# Patient Record
Sex: MALE | Race: WHITE | Employment: OTHER | URBAN - METROPOLITAN AREA
[De-identification: names, ages, dates, MRNs, and addresses within clinical notes are randomized per-mention and may not be internally consistent; named-entity substitution may affect disease eponyms.]

---

## 2018-06-18 ENCOUNTER — HOSPITAL ENCOUNTER (EMERGENCY)
Facility: CLINIC | Age: 28
Discharge: HOME | End: 2018-06-18
Attending: EMERGENCY MEDICINE | Admitting: EMERGENCY MEDICINE
Payer: MEDICARE

## 2018-06-18 VITALS
SYSTOLIC BLOOD PRESSURE: 138 MMHG | OXYGEN SATURATION: 99 % | HEART RATE: 93 BPM | DIASTOLIC BLOOD PRESSURE: 99 MMHG | TEMPERATURE: 98.7 F

## 2018-06-18 DIAGNOSIS — L02.01 ABSCESS OF FACE: ICD-10-CM

## 2018-06-18 PROCEDURE — 99283 EMERGENCY DEPT VISIT LOW MDM: CPT | Mod: Z6 | Performed by: EMERGENCY MEDICINE

## 2018-06-18 PROCEDURE — 99283 EMERGENCY DEPT VISIT LOW MDM: CPT

## 2018-06-18 PROCEDURE — 25000132 ZZH RX MED GY IP 250 OP 250 PS 637: Mod: GY | Performed by: EMERGENCY MEDICINE

## 2018-06-18 PROCEDURE — A9270 NON-COVERED ITEM OR SERVICE: HCPCS | Mod: GY | Performed by: EMERGENCY MEDICINE

## 2018-06-18 RX ORDER — CLINDAMYCIN HCL 150 MG
300 CAPSULE ORAL ONCE
Status: COMPLETED | OUTPATIENT
Start: 2018-06-18 | End: 2018-06-18

## 2018-06-18 RX ORDER — CLINDAMYCIN HCL 300 MG
300 CAPSULE ORAL 4 TIMES DAILY
Qty: 40 CAPSULE | Refills: 0 | Status: SHIPPED | OUTPATIENT
Start: 2018-06-18 | End: 2018-06-28

## 2018-06-18 RX ADMIN — CLINDAMYCIN HYDROCHLORIDE 300 MG: 150 CAPSULE ORAL at 19:07

## 2018-06-18 NOTE — ED NOTES
"Pt presents to ER with c/o frenula abscess.  Pt states 4 days ago the tip of his nose was red and swollen so he \"poked it with a pin and squeezed all of this white pus out\".  Now he has an area of swelling and redness to frenula area.  It is painful.   "

## 2018-06-18 NOTE — ED AVS SNAPSHOT
[facility] Emergency Department    [address] [address]    Phone:  [phone]    Fax:  [phone]                                       Spencer Andrea   MRN: [id number]    Department:  [facility] Emergency Department   Date of Visit:  6/18/2018           After Visit Summary Signature Page     I have received my discharge instructions, and my questions have been answered. I have discussed any challenges I see with this plan with the nurse or doctor.    ..........................................................................................................................................  Patient/Patient Representative Signature      ..........................................................................................................................................  Patient Representative Print Name and Relationship to Patient    ..................................................               ................................................  Date                                            Time    ..........................................................................................................................................  Reviewed by Signature/Title    ...................................................              ..............................................  Date                                                            Time

## 2018-06-18 NOTE — ED PROVIDER NOTES
History     Chief Complaint   Patient presents with     Wound Infection     infection on nose and into mouth     HPI  Spencer Andrea is a 28 year old male who presents with concerns for infection between the nose and upper lip.  Noted that he was able to express some pus from just inside the left naris last evening when he could not sleep due to severe pain.  Started noted swelling and pain 4 days ago.  Also has had upper tooth pain the last few days.  No noted tooth decay.  No trauma.  No piercings.  No previous facial infections.  No previous MRSA infection.  No systemic symptoms.      Problem List:    There are no active problems to display for this patient.       Past Medical History:    Past Medical History:   Diagnosis Date     Depressive disorder        Past Surgical History:    No past surgical history on file.    Family History:    No family history on file.    Social History:  Marital Status:   [2]  Social History   Substance Use Topics     Smoking status: Current Every Day Smoker     Packs/day: 1.00     Smokeless tobacco: Never Used     Alcohol use No        Medications:      clindamycin (CLEOCIN) 300 MG capsule   divalproex (DEPAKOTE ER) 500 MG 24 hr tablet   OLANZAPINE PO         Review of Systems  All pertinent positives and negatives are documented in the HPI.  All others reviewed and are negative .    Physical Exam   BP: (!) 138/99  Pulse: 93  Temp: 98.7  F (37.1  C)  SpO2: 99 %      Physical Exam  Vital signs reviewed  Facial swelling noted from the left naris soft tissue nasal septal area extending through the upper lip.  Flipping upper lip noted an abscess protruding at the gum reflection 1 x 1 cm.  No obvious tooth decay or dental tenderness.  Oral posterior pharynx normal  Able to express some pus from an opening just inside the left naris  ED Course     ED Course     Procedures  Verbal consent  Inner lip was flipped up out of the way.  The gum reflection there was a sizable abscess  protruding approximately 1 x 1 cm.  Infiltrated this with 1% lidocaine with epinephrine using 30-gauge needle.  Completed alveolar nerve block.  Open tooth #11 blade.  Blunt dissection with a mosquito.  Drained pus pocket that extended up just underneath the left naris.              Assessments & Plan (with Medical Decision Making)   28-year-old male presents with soft tissue abscess that was protruding at the gum reflection just inside the upper lip.  Was able to I&D it by completing infiltration of the tissue around the abscess and an alveolar nerve block.  Refer to the procedure note above.  Recommended treatment is clindamycin.  This will cover for the odontogenic/oral yvrose as well as MRSA.. Commended dental appointment for x-ray to make sure he does not have a tooth abscess.  Clinic recheck in 3-4 days.  Ibuprofen for discomfort.  Salt water gargles.  Compresses     I have reviewed the nursing notes.    I have reviewed the findings, diagnosis, plan and need for follow up with the patient.      New Prescriptions    CLINDAMYCIN (CLEOCIN) 300 MG CAPSULE    Take 1 capsule (300 mg) by mouth 4 times daily for 10 days       Final diagnoses:   Abscess of face-upper alveolar/gingiva extending to left naris       6/18/2018   [facility] EMERGENCY DEPARTMENT     Abelardo Yarbrough, DO  06/18/18 4220

## 2018-06-18 NOTE — ED NOTES
Assisted DR , opended cyst inside upper lip in middle, helped with clearing away puss, and retracting lip.

## 2018-06-18 NOTE — ED AVS SNAPSHOT
[facility] Emergency Department    [address] [address]    Phone:  [phone]    Fax:  [phone]                                       Spencer Andrea   MRN: [id number]    Department:  [facility] Emergency Department   Date of Visit:  6/18/2018           Patient Information     Date Of Birth          1990        Your diagnoses for this visit were:     Abscess of face-upper alveolar/gingiva extending to left naris        You were seen by Abelardo Yarbrough DO.        Discharge Instructions       Clindamycin 300 mg 4 times daily ×10 days  Arrange for a dental appointment to have dental x-rays to determine if he could have an associated tooth  Abscess.  Uncertain if abscess started in the oral cavity extending to the nasal or if it started nasal extended to oral cavity.  Warm compresses  Saltwater gargle 4 times daily  Need clinic appointment recheck in 3-4 days.  If not showing significant improvement will need CT imaging soft tissue.    24 Hour Appointment Hotline       To make an appointment at any [facility] clinic, call [phone] ([phone]). If you don't have a family doctor or clinic, we will help you find one. [facility] clinics are conveniently located to serve the needs of you and your family.             Review of your medicines      START taking        Dose / Directions Last dose taken    clindamycin 300 MG capsule   Commonly known as:  CLEOCIN   Dose:  300 mg   Quantity:  40 capsule        Take 1 capsule (300 mg) by mouth 4 times daily for 10 days   Refills:  0          Our records show that you are taking the medicines listed below. If these are incorrect, please call your family doctor or clinic.        Dose / Directions Last dose taken    divalproex sodium extended-release 500 MG 24 hr tablet   Commonly known as:  DEPAKOTE ER   Dose:  1000 mg   Indication:  Mixed Bipolar Affective Disorder        Take 1,000 mg by mouth daily   Refills:  0         "OLANZAPINE PO   Dose:  15 mg   Indication:  Schizophrenia        Take 15 mg by mouth daily as needed for other (pt states to take as needed when he hears voices)   Refills:  0                Prescriptions were sent or printed at these locations (1 Prescription)                   [facility] - [place], [place] - [address]   [address] [place], [place] [address]    Telephone:  [phone]   Fax:  [phone]   Hours:                  E-Prescribed (1 of 1)         clindamycin (CLEOCIN) 300 MG capsule                Orders Needing Specimen Collection     None      Pending Results     No orders found from 6/16/2018 to 6/19/2018.            Pending Culture Results     No orders found from 6/16/2018 to 6/19/2018.            Pending Results Instructions     If you had any lab results that were not finalized at the time of your Discharge, you can call the ED Lab Result RN at [phone]. You will be contacted by this team for any positive Lab results or changes in treatment. The nurses are available 7 days a week from 10A to 6:30P.  You can leave a message 24 hours per day and they will return your call.        Test Results From Your Hospital Stay               Thank you for choosing [facility]       Thank you for choosing [facility] for your care. Our goal is always to provide you with excellent care. Hearing back from our patients is one way we can continue to improve our services. Please take a few minutes to complete the written survey that you may receive in the mail after you visit with us. Thank you!        [employer] Information     [employer] lets you send messages to your doctor, view your test results, renew your prescriptions, schedule appointments and more. To sign up, go to [email or web address] . Click on \"Log in\" on the left side of the screen, which will take you to the Welcome page. Then click on \"Sign up Now\" on the right side of the page.     You will be asked to enter the access code listed " below, as well as some personal information. Please follow the directions to create your username and password.     Your access code is: [email or web address]  Expires: 2018  6:55 PM     Your access code will  in 90 days. If you need help or a new code, please call your [facility] [place] clinic or [phone].        Care EveryWhere ID     This is your Care EveryWhere ID. This could be used by other organizations to access your [facility] medical records  [id number]        Equal Access to Services     GURINDER LEIJA : Connor Mcpherson, wastefan cmdaniel, duglas kaaljose j hardy, les coleman . So [facility] [phone].    ATENCIÓN: Si habla español, tiene a feldman disposición servicios gratuitos de asistencia lingüística. Llame al [phone].    We comply with applicable federal civil rights laws and [place] laws. We do not discriminate on the basis of race, color, national origin, age, disability, sex, sexual orientation, or gender identity.            After Visit Summary       This is your record. Keep this with you and show to your community pharmacist(s) and doctor(s) at your next visit.

## 2018-06-18 NOTE — LETTER
June 18, 2018      To Whom It May Concern:      Spencer Andrea was seen in our Emergency Department today, 06/18/18.  Patient was seen for a facial abscess that required surgical drainage.  Currently on oral antibiotics.  Patient was medically recommended to be off work for the next 3 days until he is reassessed in the clinic.    Sincerely,        Abelardo Yarbrough Dr.  [facility] ED Staff Physician

## 2018-06-18 NOTE — DISCHARGE INSTRUCTIONS
Clindamycin 300 mg 4 times daily ×10 days  Arrange for a dental appointment to have dental x-rays to determine if he could have an associated tooth  Abscess.  Uncertain if abscess started in the oral cavity extending to the nasal or if it started nasal extended to oral cavity.  Warm compresses  Saltwater gargle 4 times daily  Need clinic appointment recheck in 3-4 days.  If not showing significant improvement will need CT imaging soft tissue.

## 2018-11-08 ENCOUNTER — HOSPITAL ENCOUNTER (EMERGENCY)
Facility: CLINIC | Age: 28
Discharge: HOME | End: 2018-11-08
Attending: NURSE PRACTITIONER | Admitting: NURSE PRACTITIONER
Payer: MEDICARE

## 2018-11-08 VITALS
DIASTOLIC BLOOD PRESSURE: 96 MMHG | HEART RATE: 82 BPM | OXYGEN SATURATION: 97 % | TEMPERATURE: 98.7 F | SYSTOLIC BLOOD PRESSURE: 144 MMHG | RESPIRATION RATE: 18 BRPM

## 2018-11-08 DIAGNOSIS — R10.32 ABDOMINAL PAIN, LEFT LOWER QUADRANT: ICD-10-CM

## 2018-11-08 DIAGNOSIS — J02.9 SORE THROAT: ICD-10-CM

## 2018-11-08 PROBLEM — F29 PSYCHOTIC DISORDER (H): Status: ACTIVE | Noted: 2018-01-13

## 2018-11-08 PROBLEM — F25.0 SCHIZOAFFECTIVE DISORDER, BIPOLAR TYPE (H): Status: ACTIVE | Noted: 2018-11-08

## 2018-11-08 LAB
DEPRECATED S PYO AG THROAT QL EIA: NORMAL
FLUAV+FLUBV AG SPEC QL: NEGATIVE
FLUAV+FLUBV AG SPEC QL: NEGATIVE
SPECIMEN SOURCE: NORMAL
SPECIMEN SOURCE: NORMAL

## 2018-11-08 PROCEDURE — 87081 CULTURE SCREEN ONLY: CPT | Performed by: NURSE PRACTITIONER

## 2018-11-08 PROCEDURE — 99284 EMERGENCY DEPT VISIT MOD MDM: CPT | Mod: Z6 | Performed by: NURSE PRACTITIONER

## 2018-11-08 PROCEDURE — 87077 CULTURE AEROBIC IDENTIFY: CPT | Performed by: NURSE PRACTITIONER

## 2018-11-08 PROCEDURE — 87880 STREP A ASSAY W/OPTIC: CPT | Performed by: EMERGENCY MEDICINE

## 2018-11-08 PROCEDURE — 99283 EMERGENCY DEPT VISIT LOW MDM: CPT | Performed by: NURSE PRACTITIONER

## 2018-11-08 PROCEDURE — 87804 INFLUENZA ASSAY W/OPTIC: CPT | Performed by: EMERGENCY MEDICINE

## 2018-11-08 ASSESSMENT — ENCOUNTER SYMPTOMS
COUGH: 1
SORE THROAT: 1
BLOOD IN STOOL: 0
BACK PAIN: 0
FEVER: 1
MYALGIAS: 0
FREQUENCY: 0
HEADACHES: 0
CHILLS: 1
NAUSEA: 0
ABDOMINAL PAIN: 1
DYSURIA: 0
VOMITING: 0
DIZZINESS: 0
DIARRHEA: 0

## 2018-11-08 NOTE — ED AVS SNAPSHOT
[facility]    [address] [address]    Phone:  [phone]    Fax:  [phone]                                       Spencer Andrea   MRN: [id number]    Department:  [facility]   Date of Visit:  11/8/2018           Patient Information     Date Of Birth          1990        Your diagnoses for this visit were:     Abdominal pain, left lower quadrant-- recommend follow-up with general surgeon for hernia evaluation     Sore throat- likely viral respiratory illness. Rapid strep and influenza negative. Throat culture pending.        You were seen by Anisa Robles APRN CNP.      Follow-up Information     Schedule an appointment as soon as possible for a visit with [facility].    Specialty:  Surgery    Contact information:    [address] [place] [address] [phone]    Additional information:    The medical center is located at   [address] [place] [address] (between [facility] and   [facility] [place], [place] north   of [place]).        Discharge Instructions       Make an appointment with the Surgery Clinic for further evaluation of possible left inguinal hernia. [phone].  Symptomatic treatment for sore throat and cough.    Viral Upper Respiratory Illness (Adult)  You have a viral upper respiratory illness (URI), which is another term for the common cold. This illness is contagious during the first few days. It is spread through the air by coughing and sneezing. It may also be spread by direct contact (touching the sick person and then touching your own eyes, nose, or mouth). Frequent handwashing will decrease risk of spread. Most viral illnesses go away within 7 to 10 days with rest and simple home remedies. Sometimes the illness may last for several weeks. Antibiotics will not kill a virus, and they are generally not prescribed for this condition.    Home care    If symptoms are severe,  rest at home for the first 2 to 3 days. When you resume activity, don't let yourself get too tired.    Don't smoke. If you need help stopping, talk with your healthcare provider.    Avoid being exposed to cigarette smoke (yours or others ).    You may use acetaminophen or ibuprofen to control pain and fever, unless another medicine was prescribed. If you have chronic liver or kidney disease, have ever had a stomach ulcer or gastrointestinal bleeding, or are taking blood-thinning medicines, talk with your healthcare provider before using these medicines. Aspirin should never be given to anyone under 18 years of age who is ill with a viral infection or fever. It may cause severe liver or brain damage.    Your appetite may be poor, so a light diet is fine. Stay well hydrated by drinking 6 to 8 glasses of fluids per day (water, soft drinks, juices, tea, or soup). Extra fluids will help loosen secretions in the nose and lungs.    Over-the-counter cold medicines will not shorten the length of time you re sick, but they may be helpful for the following symptoms: cough, sore throat, and nasal and sinus congestion. If you take prescription medicines, ask your healthcare provider or pharmacist which over-the-counter medicines are safe to use. (Note: Don't use decongestants if you have high blood pressure.)  Follow-up care  Follow up with your healthcare provider, or as advised.  When to seek medical advice  Call your healthcare provider right away if any of these occur:    Cough with lots of colored sputum (mucus)    Severe headache; face, neck, or ear pain    Difficulty swallowing due to throat pain    Fever of 100.4 F (38 C) or higher, or as directed by your healthcare provider  Call 911  Call 911 if any of these occur:    Chest pain, shortness of breath, wheezing, or difficulty breathing    Coughing up blood    Very severe pain with swallowing, especially if it goes along with a muffled voice   Date Last Reviewed:  6/1/2018 2000-2018 [employer]. [address] [place] [address], [place] [address]. All rights reserved. This information is not intended as a substitute for professional medical care. Always follow your healthcare professional's instructions.             24 Hour Appointment Hotline       To make an appointment at any [facility], call [phone]). If you don't have a family doctor or clinic, we will help you find one. [facility] are conveniently located to serve the needs of you and your family.             Review of your medicines      Notice     You have not been prescribed any medications.            Procedures and tests performed during your visit     Beta strep group A culture    Influenza A/B antigen    Rapid strep screen      Orders Needing Specimen Collection     None      Pending Results     Date and Time Order Name Status Description    11/8/2018 0947 Beta strep group A culture In process             Pending Culture Results     Date and Time Order Name Status Description    11/8/2018 0947 Beta strep group A culture In process             Pending Results Instructions     If you had any lab results that were not finalized at the time of your Discharge, you can call the ED Lab Result RN at [phone]. You will be contacted by this team for any positive Lab results or changes in treatment. The nurses are available 7 days a week from 10A to 6:30P.  You can leave a message 24 hours per day and they will return your call.        Test Results From Your Hospital Stay        11/8/2018 10:12 AM      Component Results     Component    Specimen Description    Throat    Rapid Strep A Screen    NEGATIVE: No Group A streptococcal antigen detected by immunoassay, await culture report.         11/8/2018 10:19 AM      Component Results     Component Value Ref Range & Units Status    Influenza A/B Agn Specimen Nasopharyngeal  Final    Influenza A Negative NEG^Negative Final     "Influenza B Negative NEG^Negative Final    Test results must be correlated with clinical data. If necessary, results   should be confirmed by a molecular assay or viral culture.           2018 10:13 AM                Thank you for choosing [facility]       Thank you for choosing [facility] for your care. Our goal is always to provide you with excellent care. Hearing back from our patients is one way we can continue to improve our services. Please take a few minutes to complete the written survey that you may receive in the mail after you visit with us. Thank you!        [employer] Information     [employer] lets you send messages to your doctor, view your test results, renew your prescriptions, schedule appointments and more. To sign up, go to [email or web address] . Click on \"Log in\" on the left side of the screen, which will take you to the Welcome page. Then click on \"Sign up Now\" on the right side of the page.     You will be asked to enter the access code listed below, as well as some personal information. Please follow the directions to create your username and password.     Your access code is: [email or web address]  Expires: 2019 10:46 AM     Your access code will  in 90 days. If you need help or a new code, please call your [facility] clinic or [phone].        Care EveryWhere ID     This is your Care EveryWhere ID. This could be used by other organizations to access your [facility] medical records  [id number]        Equal Access to Services     GURINDER LEIJA : Hadii iris Mcpherson, waaxda lujuanadaha, qaybta kaalmada hayley, les ocleman . So [facility] [phone].    ATENCIÓN: Si habla español, tiene a feldman disposición servicios gratuitos de asistencia lingüística. Llame al [phone].    We comply with applicable federal civil rights laws and [place] laws. We do not discriminate on the basis of race, color, national origin, age, disability, sex, sexual orientation, or " gender identity.            After Visit Summary       This is your record. Keep this with you and show to your community pharmacist(s) and doctor(s) at your next visit.

## 2018-11-08 NOTE — ED AVS SNAPSHOT
[facility] Emergency Department    [address] [address]    Phone:  [phone]    Fax:  [phone]                                       Spencer Andrea   MRN: [id number]    Department:  [facility] Emergency Department   Date of Visit:  11/8/2018           After Visit Summary Signature Page     I have received my discharge instructions, and my questions have been answered. I have discussed any challenges I see with this plan with the nurse or doctor.    ..........................................................................................................................................  Patient/Patient Representative Signature      ..........................................................................................................................................  Patient Representative Print Name and Relationship to Patient    ..................................................               ................................................  Date                                   Time    ..........................................................................................................................................  Reviewed by Signature/Title    ...................................................              ..............................................  Date                                               Time          22EPIC Rev 08/18

## 2018-11-08 NOTE — ED PROVIDER NOTES
History     Chief Complaint   Patient presents with     Pharyngitis     x 3 days     Abdominal Pain     he states he has a hernia.     HPI  Spencer Andrea is a 28 year old male with history of schizoaffective disorder, cannabis and methamphetamine use, and depression who is accompanied by his father for evaluation of sore throat and abdominal pain.  Patient reports sore throat and cough for the last 3 days.  Feels feverish.  Cough is nonproductive.  Additionally patient reports abdominal pain for several months.  Patient was told he had a lower abdominal hernia several years ago and is concerned that the hernia is worse and causing his pain.  Abdominal pain comes and goes.  More frequent pain over the last few months.  Eating and drinking normally.  Denies nausea or vomiting.  Denies diarrhea.  Denies black or bloody stool.    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    Family History:    No family history on file.    Social History:  Marital Status:   [2]  Social History   Substance Use Topics     Smoking status: Current Every Day Smoker     Packs/day: 1.00     Smokeless tobacco: Never Used     Alcohol use No        Medications:      No current outpatient prescriptions on file.      Review of Systems   Constitutional: Positive  for chills and fever.   HENT: Positive for sore throat. Negative for congestion and ear pain.    Respiratory: Positive for cough.    Cardiovascular: Negative for chest pain.   Gastrointestinal: Positive for abdominal pain. Negative for blood in stool, diarrhea, nausea and vomiting.   Genitourinary: Negative for dysuria, frequency, testicular pain and urgency.   Musculoskeletal: Negative for back pain and myalgias.   Skin: Negative for rash.   Neurological: Negative for dizziness and headaches.       Physical Exam   BP: (!) 144/96  Pulse: 82  Temp: 98.7  F (37.1  C)  Resp: 18  SpO2: 97 %      Physical Exam   Constitutional: He is oriented to person, place, and time. He appears well-developed and well-nourished. No distress.   HENT:   Head: Normocephalic and atraumatic.   Right Ear: External ear normal.   Left Ear: External ear normal.   Nose: Nose normal.   Mouth/Throat: Oropharynx is clear and moist.   Eyes: Conjunctivae and EOM are normal.   Neck: Normal range of motion. Neck supple.   Cardiovascular: Normal rate, regular rhythm and normal heart sounds.    No murmur heard.  Pulmonary/Chest: Effort normal and breath sounds normal. No respiratory distress.   Abdominal: Soft. Bowel sounds are normal. He exhibits no distension. There is no tenderness.   Genitourinary: Testes normal and penis normal. Cremasteric reflex is present. Right testis shows no mass and no tenderness. Left testis shows no mass and no tenderness. Circumcised. No phimosis, penile erythema or penile tenderness.   Genitourinary Comments: No palpable abdominal or inguinal hernia.   Musculoskeletal: Normal range of motion.   Lymphadenopathy:     He has no cervical adenopathy.   Neurological: He is alert and oriented to person, place, and time.   Skin: Skin is warm and dry.       ED Course     ED Course     Procedures         Results for orders placed or performed during the hospital encounter of 11/08/18 (from the past 24 hour(s))   Rapid strep screen    Result Value Ref Range    Specimen Description Throat     Rapid Strep A Screen       NEGATIVE: No Group A streptococcal antigen detected by immunoassay, await culture report.   Influenza A/B antigen   Result Value Ref Range    Influenza A/B Agn Specimen Nasopharyngeal     Influenza A Negative NEG^Negative    Influenza B Negative NEG^Negative       Medications - No data to display    Assessments & Plan (with Medical Decision Making)   28 year old male with cough and sore throat for 3 days.  Also reporting left low abdominal pain that comes/goes for months and told he has a hernia in this region several years ago.  On exam patient is in NAD. Afebrile. Posterior oropharynx erythema without exudate. Lung sounds CTA. No tachypnea, tachycardia, or hypoxia. No abdominal discomfort with palpation, no obvious abdominal or inguinal hernia palpated.  RST negative with culture pending.  No evidence of peritonsillar cellulitis or abscess.  Influenza A/B negative. No indication for chest xray at this point. Father and patient were instructed to continue OTC symptomatic treatment.  Follow up with PCP if no improvement in 5-7 days. Make an appointment with general surgery clinic for further evaluation of intermittent abdominal pain with possible hernia.  Worrisome reasons to seek care sooner discussed.      I have reviewed the nursing notes.    I have reviewed the findings, diagnosis, plan and need for follow up with the patient.      New Prescriptions    No medications on file       Final diagnoses:   Abdominal pain, left lower quadrant-- recommend follow-up with general surgeon for hernia evaluation   Sore throat- likely viral respiratory illness. Rapid strep and influenza negative. Throat culture pending.       11/8/2018   [facility] EMERGENCY DEPARTMENT     Anisa Robles, MELISSA CNP  11/08/18 3502

## 2018-11-08 NOTE — DISCHARGE INSTRUCTIONS
Make an appointment with the Surgery Clinic for further evaluation of possible left inguinal hernia. [phone].  Symptomatic treatment for sore throat and cough.    Viral Upper Respiratory Illness (Adult)  You have a viral upper respiratory illness (URI), which is another term for the common cold. This illness is contagious during the first few days. It is spread through the air by coughing and sneezing. It may also be spread by direct contact (touching the sick person and then touching your own eyes, nose, or mouth). Frequent handwashing will decrease risk of spread. Most viral illnesses go away within 7 to 10 days with rest and simple home remedies. Sometimes the illness may last for several weeks. Antibiotics will not kill a virus, and they are generally not prescribed for this condition.    Home care    If symptoms are severe, rest at home for the first 2 to 3 days. When you resume activity, don't let yourself get too tired.    Don't smoke. If you need help stopping, talk with your healthcare provider.    Avoid being exposed to cigarette smoke (yours or others ).    You may use acetaminophen or ibuprofen to control pain and fever, unless another medicine was prescribed. If you have chronic liver or kidney disease, have ever had a stomach ulcer or gastrointestinal bleeding, or are taking blood-thinning medicines, talk with your healthcare provider before using these medicines. Aspirin should never be given to anyone under 18 years of age who is ill with a viral infection or fever. It may cause severe liver or brain damage.    Your appetite may be poor, so a light diet is fine. Stay well hydrated by drinking 6 to 8 glasses of fluids per day (water, soft drinks, juices, tea, or soup). Extra fluids will help loosen secretions in the nose and lungs.    Over-the-counter cold medicines will not shorten the length of time you re sick, but they may be helpful for the following symptoms: cough, sore throat, and nasal  and sinus congestion. If you take prescription medicines, ask your healthcare provider or pharmacist which over-the-counter medicines are safe to use. (Note: Don't use decongestants if you have high blood pressure.)  Follow-up care  Follow up with your healthcare provider, or as advised.  When to seek medical advice  Call your healthcare provider right away if any of these occur:    Cough with lots of colored sputum (mucus)    Severe headache; face, neck, or ear pain    Difficulty swallowing due to throat pain    Fever of 100.4 F (38 C) or higher, or as directed by your healthcare provider  Call 911  Call 911 if any of these occur:    Chest pain, shortness of breath, wheezing, or difficulty breathing    Coughing up blood    Very severe pain with swallowing, especially if it goes along with a muffled voice   Date Last Reviewed: 6/1/2018 2000-2018 [employer]. [address] [place] [address]. All rights reserved. This information is not intended as a substitute for professional medical care. Always follow your healthcare professional's instructions.

## 2018-11-10 LAB
BACTERIA SPEC CULT: ABNORMAL
Lab: ABNORMAL
SPECIMEN SOURCE: ABNORMAL

## 2018-11-12 ENCOUNTER — OFFICE VISIT (OUTPATIENT)
Dept: SURGERY | Facility: CLINIC | Age: 28
End: 2018-11-12
Payer: MEDICARE

## 2018-11-12 ENCOUNTER — HOSPITAL ENCOUNTER (OUTPATIENT)
Facility: CLINIC | Age: 28
End: 2018-11-12
Attending: SURGERY | Admitting: SURGERY
Payer: MEDICARE

## 2018-11-12 VITALS
WEIGHT: 165 LBS | DIASTOLIC BLOOD PRESSURE: 68 MMHG | BODY MASS INDEX: 26.52 KG/M2 | SYSTOLIC BLOOD PRESSURE: 122 MMHG | TEMPERATURE: 97.9 F | HEIGHT: 66 IN | HEART RATE: 56 BPM

## 2018-11-12 DIAGNOSIS — K40.90 INGUINAL HERNIA OF LEFT SIDE WITHOUT OBSTRUCTION OR GANGRENE: Primary | ICD-10-CM

## 2018-11-12 PROCEDURE — 99204 OFFICE O/P NEW MOD 45 MIN: CPT | Performed by: SURGERY

## 2018-11-12 ASSESSMENT — PAIN SCALES - GENERAL: PAINLEVEL: MILD PAIN (2)

## 2018-11-12 NOTE — NURSING NOTE
"Initial /68 (BP Location: Right arm, Patient Position: Sitting, Cuff Size: Adult Regular)  Pulse 56  Temp 97.9  F (36.6  C) (Oral)  Ht 1.676 m (5' 6\")  Wt 74.8 kg (165 lb)  BMI 26.63 kg/m2 Estimated body mass index is 26.63 kg/(m^2) as calculated from the following:    Height as of this encounter: 1.676 m (5' 6\").    Weight as of this encounter: 74.8 kg (165 lb). .    Swati Sanchez CMA    "

## 2018-11-12 NOTE — MR AVS SNAPSHOT
"              After Visit Summary   2018    Spencer Andrea    MRN: [id number]           Patient Information     Date Of Birth          1990        Visit Information        Provider Department      2018 1:30 PM Severiano Vanegas MD [facility]        Today's Diagnoses     Inguinal hernia of left side without obstruction or gangrene    -  1      Care Instructions    Per Physician's instructions            Follow-ups after your visit        Who to contact     If you have questions or need follow up information about today's clinic visit or your schedule please contact [facility] directly at [phone].  Normal or non-critical lab and imaging results will be communicated to you by Giphyhart, letter or phone within 4 business days after the clinic has received the results. If you do not hear from us within 7 days, please contact the clinic through Giphyhart or phone. If you have a critical or abnormal lab result, we will notify you by phone as soon as possible.  Submit refill requests through [employer] or call your pharmacy and they will forward the refill request to us. Please allow 3 business days for your refill to be completed.          Additional Information About Your Visit        MyChart Information     [employer] lets you send messages to your doctor, view your test results, renew your prescriptions, schedule appointments and more. To sign up, go to [email or web address] . Click on \"Log in\" on the left side of the screen, which will take you to the Welcome page. Then click on \"Sign up Now\" on the right side of the page.     You will be asked to enter the access code listed below, as well as some personal information. Please follow the directions to create your username and password.     Your access code is: [email or web address]  Expires: 2019 10:46 AM     Your access code will  in 90 days. If you need help or a new code, please call your [facility] or [phone].   " "     Care EveryWhere ID     This is your Care EveryWhere ID. This could be used by other organizations to access your [place] medical records  [id number]        Your Vitals Were     Pulse Temperature Height BMI (Body Mass Index)          56 97.9  F (36.6  C) (Oral) 1.676 m (5' 6\") 26.63 kg/m2         Blood Pressure from Last 3 Encounters:   11/12/18 122/68   11/08/18 (!) 144/96   06/18/18 (!) 138/99    Weight from Last 3 Encounters:   11/12/18 74.8 kg (165 lb)   08/11/16 81.6 kg (180 lb)              Today, you had the following     No orders found for display       Primary Care Provider Office Phone # Fax #    [facility] [phone] [address] [facility] [address]        Equal Access to Services     GURINDER LEIJA : Hadii iris hdezo Sonoemí, waaxda luqadaha, qaybta kaalmada adezakyada, les coleman . So [facility] [phone].    ATENCIÓN: Si habla español, tiene a feldman disposición servicios gratuitos de asistencia lingüística. Llame al [phone].    We comply with applicable federal civil rights laws and [place] laws. We do not discriminate on the basis of race, color, national origin, age, disability, sex, sexual orientation, or gender identity.            Thank you!     Thank you for choosing [facility]  for your care. Our goal is always to provide you with excellent care. Hearing back from our patients is one way we can continue to improve our services. Please take a few minutes to complete the written survey that you may receive in the mail after your visit with us. Thank you!             Your Updated Medication List - Protect others around you: Learn how to safely use, store and throw away your medicines at www.disposemymeds.org.      Notice  As of 11/12/2018  2:11 PM    You have not been prescribed any medications.      "

## 2018-11-12 NOTE — PROGRESS NOTES
PCP:  [facility]    Chief complaint: Hernia    History of Present Illness:  Spencer is a 28-year-old man with no significant medical or surgical history other than a history of schizoaffective disorder, bipolar disorder and a history of substance abuse. He presents to the surgical clinic as a referral from the emergency room for possible hernia. He describes a hernia on the left side which reduces spontaneously when he lays down. He recurs immediately upon standing. It is down into the scrotum. No bowel or bladder obstructive symptoms. He has not had repairs in the past or any type of surgery.    Histories:  Past Medical History:   Diagnosis Date     Depressive disorder        History reviewed. No pertinent surgical history.    History reviewed. No pertinent family history.    Social History   Substance Use Topics     Smoking status: Current Every Day Smoker     Packs/day: 1.00     Smokeless tobacco: Never Used      Comment: would like to quit     Alcohol use No       No current outpatient prescriptions on file.       No Known Allergies    Images:  No results found for this or any previous visit (from the past 744 hour(s)).    Labs:  Results for orders placed or performed during the hospital encounter of 11/08/18   Rapid strep screen   Result Value Ref Range    Specimen Description Throat     Rapid Strep A Screen       NEGATIVE: No Group A streptococcal antigen detected by immunoassay, await culture report.   Influenza A/B antigen   Result Value Ref Range    Influenza A/B Agn Specimen Nasopharyngeal     Influenza A Negative NEG^Negative    Influenza B Negative NEG^Negative   Beta strep group A culture   Result Value Ref Range    Specimen Description Throat     Special Requests Specimen collected in eSwab transport (white cap)     Culture Micro (A)      Light growth  Beta hemolytic Streptococcus, not group A         ROS:  Constitutional - Denies fevers, weight loss, malaise, lethargy  Neuro -  "Denies tremors or seizures  Pulmon - Denies SOB, dyspnea, hemoptysis, chronic cough or use of an inhaler  CV - Denies CP, SOB, lower extremity edema, difficulty w/ stairs, has never used NTG  GI - Denies hematemesis, BRBPR, melena, chronic diarrhea or epigastric pain   - Denies hematuria, difficulty voiding, h/o STDs  Hematology - Denies blood clotting disorders, chronic anemias  Dermatology - No melanomas or skin cancers  Rheumatology - No h/o RA  Pysch -see above    /68 (BP Location: Right arm, Patient Position: Sitting, Cuff Size: Adult Regular)  Pulse 56  Temp 97.9  F (36.6  C) (Oral)  Ht 1.676 m (5' 6\")  Wt 74.8 kg (165 lb)  BMI 26.63 kg/m2    Exam:  General - Alert and Oriented X4, NAD, well nourished  HEENT - Normocephalic, atraumatic  Neck - supple, no LAD  Lungs - respirations unlabored  CV - Heart RRR  Abdomen - Soft, non-tender, +BS, no hepatosplenomegaly, no palpable masses  Groins - 2+ pulses bilaterally obvious left sided inguinal scrotal hernia which is reducible, the right side is normal  Rectal -deferred  Neuro - Full ROM, Strength 5/5 and major muscle groups, sensation intact  Extremities - No cyanosis, clubbing or edema.      Assessment and Plan: Left inguinal hernia is obvious. This should be repaired because it is at risk for incarceration. It reduces, but not spontaneously and probably contains some bowel.    The procedure, risks, benefits, and alternatives were discussed with him in detail. He would like to proceed. I have no doubt about his ability to give consent. He is healthy except for psychiatric issues, but we will get a preoperative physical to establish medical care.    We will schedule this at his convenience, but probably sometime after Thanksgiving.    Severiano Vanegas MD FACS        Severiano Vanegas MD           "

## 2018-11-12 NOTE — LETTER
11/12/2018         RE: Spencer Andrea  [address]  [facility] [address]        Dear Colleague,    Thank you for referring your patient, Spencer Andrea, to the [facility]. Please see a copy of my visit note below.    PCP:  [facility]    Chief complaint: Hernia    History of Present Illness:  Spencer is a 28-year-old man with no significant medical or surgical history other than a history of schizoaffective disorder, bipolar disorder and a history of substance abuse. He presents to the surgical clinic as a referral from the emergency room for possible hernia. He describes a hernia on the left side which reduces spontaneously when he lays down. He recurs immediately upon standing. It is down into the scrotum. No bowel or bladder obstructive symptoms. He has not had repairs in the past or any type of surgery.    Histories:  Past Medical History:   Diagnosis Date     Depressive disorder        History reviewed. No pertinent surgical history.    History reviewed. No pertinent family history.    Social History   Substance Use Topics     Smoking status: Current Every Day Smoker     Packs/day: 1.00     Smokeless tobacco: Never Used      Comment: would like to quit     Alcohol use No       No current outpatient prescriptions on file.       No Known Allergies    Images:  No results found for this or any previous visit (from the past 744 hour(s)).    Labs:  Results for orders placed or performed during the hospital encounter of 11/08/18   Rapid strep screen   Result Value Ref Range    Specimen Description Throat     Rapid Strep A Screen       NEGATIVE: No Group A streptococcal antigen detected by immunoassay, await culture report.   Influenza A/B antigen   Result Value Ref Range    Influenza A/B Agn Specimen Nasopharyngeal     Influenza A Negative NEG^Negative    Influenza B Negative NEG^Negative   Beta strep group A culture   Result Value Ref Range    Specimen Description Throat   "   Special Requests Specimen collected in eSwab transport (white cap)     Culture Micro (A)      Light growth  Beta hemolytic Streptococcus, not group A         ROS:  Constitutional - Denies fevers, weight loss, malaise, lethargy  Neuro - Denies tremors or seizures  Pulmon - Denies SOB, dyspnea, hemoptysis, chronic cough or use of an inhaler  CV - Denies CP, SOB, lower extremity edema, difficulty w/ stairs, has never used NTG  GI - Denies hematemesis, BRBPR, melena, chronic diarrhea or epigastric pain   - Denies hematuria, difficulty voiding, h/o STDs  Hematology - Denies blood clotting disorders, chronic anemias  Dermatology - No melanomas or skin cancers  Rheumatology - No h/o RA  Pysch -see above    /68 (BP Location: Right arm, Patient Position: Sitting, Cuff Size: Adult Regular)  Pulse 56  Temp 97.9  F (36.6  C) (Oral)  Ht 1.676 m (5' 6\")  Wt 74.8 kg (165 lb)  BMI 26.63 kg/m2    Exam:  General - Alert and Oriented X4, NAD, well nourished  HEENT - Normocephalic, atraumatic  Neck - supple, no LAD  Lungs - respirations unlabored  CV - Heart RRR  Abdomen - Soft, non-tender, +BS, no hepatosplenomegaly, no palpable masses  Groins - 2+ pulses bilaterally obvious left sided inguinal scrotal hernia which is reducible, the right side is normal  Rectal -deferred  Neuro - Full ROM, Strength 5/5 and major muscle groups, sensation intact  Extremities - No cyanosis, clubbing or edema.      Assessment and Plan: Left inguinal hernia is obvious. This should be repaired because it is at risk for incarceration. It reduces, but not spontaneously and probably contains some bowel.    The procedure, risks, benefits, and alternatives were discussed with him in detail. He would like to proceed. I have no doubt about his ability to give consent. He is healthy except for psychiatric issues, but we will get a preoperative physical to establish medical care.    We will schedule this at his convenience, but probably sometime " after Thanksgiving.    Severiano Vanegas MD FACS        Severiano Vanegas MD             Again, thank you for allowing me to participate in the care of your patient.        Sincerely,        Severiano Vanegas MD

## 2018-12-03 ENCOUNTER — TELEPHONE (OUTPATIENT)
Dept: SURGERY | Facility: CLINIC | Age: 28
End: 2018-12-03

## 2018-12-12 ENCOUNTER — TELEPHONE (OUTPATIENT)
Dept: SURGERY | Facility: CLINIC | Age: 28
End: 2018-12-12

## 2018-12-12 NOTE — TELEPHONE ENCOUNTER
Reason for Call:  Other     Detailed comments: pt wife stating pt is incarcerated and need to cancel his surgery on 12/19. Will call back to r/s     Phone Number Patient can be reached at: Home number on file [phone] (home)    Best Time: any     Can we leave a detailed message on this number? YES    Call taken on 12/12/2018 at 10:20 AM by Luma Morin

## 2019-09-09 ENCOUNTER — OFFICE VISIT (OUTPATIENT)
Dept: FAMILY MEDICINE | Facility: CLINIC | Age: 29
End: 2019-09-09
Payer: MEDICARE

## 2019-09-09 VITALS
HEIGHT: 66 IN | HEART RATE: 78 BPM | SYSTOLIC BLOOD PRESSURE: 106 MMHG | TEMPERATURE: 97.1 F | OXYGEN SATURATION: 96 % | BODY MASS INDEX: 31.66 KG/M2 | WEIGHT: 197 LBS | DIASTOLIC BLOOD PRESSURE: 70 MMHG | RESPIRATION RATE: 18 BRPM

## 2019-09-09 DIAGNOSIS — B97.89 VIRAL LARYNGITIS: ICD-10-CM

## 2019-09-09 DIAGNOSIS — B00.9 HERPES SIMPLEX VIRUS INFECTION: Primary | ICD-10-CM

## 2019-09-09 DIAGNOSIS — J04.0 VIRAL LARYNGITIS: ICD-10-CM

## 2019-09-09 PROCEDURE — 99213 OFFICE O/P EST LOW 20 MIN: CPT | Performed by: FAMILY MEDICINE

## 2019-09-09 RX ORDER — ALBUTEROL SULFATE 90 UG/1
2 AEROSOL, METERED RESPIRATORY (INHALATION) EVERY 4 HOURS PRN
COMMUNITY
Start: 2019-04-30

## 2019-09-09 RX ORDER — DIPHENHYDRAMINE HCL 50 MG/1
50 CAPSULE ORAL AT BEDTIME
Refills: 3 | COMMUNITY
Start: 2019-05-14

## 2019-09-09 RX ORDER — VALACYCLOVIR HYDROCHLORIDE 1 G/1
1000 TABLET, FILM COATED ORAL 2 TIMES DAILY
Qty: 14 TABLET | Refills: 5 | Status: SHIPPED | OUTPATIENT
Start: 2019-09-09 | End: 2019-09-16

## 2019-09-09 ASSESSMENT — MIFFLIN-ST. JEOR: SCORE: 1801.34

## 2019-09-09 NOTE — PATIENT INSTRUCTIONS
Thank you for choosing [facility].  You may be receiving an email and/or telephone survey request from [facility] Customer Experience regarding your visit today.  Please take a few minutes to respond to the survey to let us know how we are doing.      If you have questions or concerns, please contact us via C[employer]Cow or you can contact your care team at [phone].    Our Clinic hours are:  Monday 6:40 am  to 7:00 pm  Tuesday -Friday 6:40 am to 5:00 pm    The [place] outpatient lab hours are:  Monday - Friday 6:10 am to 4:45 pm  Saturdays 7:00 am to 11:00 am  Appointments are required, call [phone]    If you have clinical questions after hours or would like to schedule an appointment,  call the clinic at [phone].    (B00.9) Herpes simplex virus infection  (primary encounter diagnosis)  Comment:   Plan: valACYclovir (VALTREX) 1000 mg tablet        Take the med twice daily for 7 days with an outbreak of the cold sores, or Herpes.   Avoid contagious exposures. Refills are done for one year.     (J04.0,  B97.89) Viral laryngitis  Comment:   Plan: rest the voice and avoid straining it. Stay well hydrated. Use the Robitussin DM cough med.   Recheck as needed.

## 2019-09-09 NOTE — PROGRESS NOTES
Subjective     Spencer Andrea is a 29 year old male who presents to clinic today for the following health issues:    HPI   ED/UC Followup:    Facility:  [facility] [place]  Date of visit: 8-30-19  Reason for visit: Sore throat, slight cough, nasal congestion, rhinorrhea.  Diagnosed as a Viral Syndrome.    Current Status: He is still having the sore throat.  Changes with his voice.  He does get sores on his lips and is concerned that a blister opened and he licked the area and that is now what is making his throat sore.  He has some sores on the inside of the right mouth.  Tongue has been sore also.  He did apply some Abreva on the sores in the mouth and the back of the throat.  That has helped some with the pain.  Wanting to know if there is something else he can use for his symptoms.       ESTABLISH CARE:  He is here to establish care today.  He will need to find a Provider who can authorize his Banophen and Invega injection.  He currently does the injections at home with his Aunt.  He lives with his Aunt.        Current Outpatient Medications:      albuterol (PROAIR HFA/PROVENTIL HFA/VENTOLIN HFA) 108 (90 Base) MCG/ACT inhaler, Inhale 2 puffs into the lungs every 4 hours as needed, Disp: , Rfl:      BANOPHEN 50 MG capsule, Take 50 mg by mouth At Bedtime, Disp: , Rfl: 3     paliperidone (INVEGA SUSTENNA) 234 MG/1.5ML RAFAELA, Inject 234 mg into the muscle every 28 days, Disp: , Rfl:      valACYclovir (VALTREX) 1000 mg tablet, Take 1 tablet (1,000 mg) by mouth 2 times daily for 7 days, Disp: 14 tablet, Rfl: 5    Patient Active Problem List   Diagnosis     Bipolar disorder, current episode manic, severe with psychotic features (H)     Cannabis use disorder, mild, abuse     Methamphetamine use disorder, moderate (H)     Psychotic disorder (H)     Schizoaffective disorder, bipolar type (H)     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 rate 18, height 1.676 m (5' 6\"), weight 89.4 kg (197 lb), SpO2 96 %.    Exam:  GENERAL APPEARANCE: healthy, alert and no distress  EYES: EOMI,  PERRL  HENT: ear canals and TM's normal and nose and mouth without ulcers or lesions  HENT: the voice is dysphonic.   NECK: no adenopathy, no asymmetry, masses, or scars and thyroid normal to palpation  RESP: lungs clear to auscultation - no rales, rhonchi or wheezes  SKIN: no suspicious lesions or rashes      (B00.9) Herpes simplex virus infection  (primary encounter diagnosis)  Comment:   Plan: valACYclovir (VALTREX) 1000 mg tablet        Take the med twice daily for 7 days with an outbreak of the cold sores, or Herpes.   Avoid contagious exposures. Refills are done for one year.     (J04.0,  B97.89) Viral laryngitis  Comment:   Plan: rest the voice and avoid straining it. Stay well hydrated. Use the Robitussin DM cough med.   Recheck as needed.       Severiano Barrow MD    "

## 2022-05-06 NOTE — TELEPHONE ENCOUNTER
Ana from [facility] called stating message was left with them that pt would like to reschedule his lt inguinal hernia surgery (scheduled now for 12/05/2018)    Luly Behrendt  Specialty CSS     Pt contacted- pt scheduled for lab appt on Wed 5/11.     Future Appointments   Date Time Provider Amberly Bárbara   5/11/2022  2:45 PM REF [facility] REF [facility]   5/13/2022  4:00 PM Celia Demarco MD [facility] [place]